# Patient Record
Sex: FEMALE | Race: WHITE | NOT HISPANIC OR LATINO
[De-identification: names, ages, dates, MRNs, and addresses within clinical notes are randomized per-mention and may not be internally consistent; named-entity substitution may affect disease eponyms.]

---

## 2019-05-07 PROBLEM — Z00.00 ENCOUNTER FOR PREVENTIVE HEALTH EXAMINATION: Status: ACTIVE | Noted: 2019-05-07

## 2019-05-09 ENCOUNTER — RECORD ABSTRACTING (OUTPATIENT)
Age: 69
End: 2019-05-09

## 2019-05-09 DIAGNOSIS — Z80.0 FAMILY HISTORY OF MALIGNANT NEOPLASM OF DIGESTIVE ORGANS: ICD-10-CM

## 2019-05-09 DIAGNOSIS — Z83.3 FAMILY HISTORY OF DIABETES MELLITUS: ICD-10-CM

## 2019-05-09 DIAGNOSIS — Z82.5 FAMILY HISTORY OF ASTHMA AND OTHER CHRONIC LOWER RESPIRATORY DISEASES: ICD-10-CM

## 2019-05-09 LAB — CYTOLOGY CVX/VAG DOC THIN PREP: NORMAL

## 2019-05-09 RX ORDER — ERGOCALCIFEROL (VITAMIN D2) 1250 MCG
50000 CAPSULE ORAL
Refills: 0 | Status: ACTIVE | COMMUNITY

## 2019-05-16 ENCOUNTER — RX RENEWAL (OUTPATIENT)
Age: 69
End: 2019-05-16

## 2019-05-23 ENCOUNTER — RX RENEWAL (OUTPATIENT)
Age: 69
End: 2019-05-23

## 2019-06-05 ENCOUNTER — APPOINTMENT (OUTPATIENT)
Dept: INTERNAL MEDICINE | Facility: CLINIC | Age: 69
End: 2019-06-05
Payer: COMMERCIAL

## 2019-06-05 PROCEDURE — 36415 COLL VENOUS BLD VENIPUNCTURE: CPT

## 2019-06-06 LAB
ALBUMIN SERPL ELPH-MCNC: 4.3 G/DL
ALP BLD-CCNC: 68 U/L
ALT SERPL-CCNC: 12 U/L
ANION GAP SERPL CALC-SCNC: 14 MMOL/L
AST SERPL-CCNC: 11 U/L
BILIRUB SERPL-MCNC: 0.5 MG/DL
BUN SERPL-MCNC: 12 MG/DL
CALCIUM SERPL-MCNC: 9.8 MG/DL
CHLORIDE SERPL-SCNC: 102 MMOL/L
CHOLEST SERPL-MCNC: 165 MG/DL
CHOLEST/HDLC SERPL: 3.1 RATIO
CK SERPL-CCNC: 45 U/L
CO2 SERPL-SCNC: 26 MMOL/L
CREAT SERPL-MCNC: 0.7 MG/DL
ESTIMATED AVERAGE GLUCOSE: 97 MG/DL
GLUCOSE SERPL-MCNC: 103 MG/DL
HBA1C MFR BLD HPLC: 5 %
HDLC SERPL-MCNC: 54 MG/DL
LDLC SERPL CALC-MCNC: 96 MG/DL
POTASSIUM SERPL-SCNC: 4.3 MMOL/L
PROT SERPL-MCNC: 6.4 G/DL
SODIUM SERPL-SCNC: 142 MMOL/L
TRIGL SERPL-MCNC: 74 MG/DL

## 2019-06-13 ENCOUNTER — RESULT REVIEW (OUTPATIENT)
Age: 69
End: 2019-06-13

## 2019-06-14 ENCOUNTER — APPOINTMENT (OUTPATIENT)
Dept: GASTROENTEROLOGY | Facility: HOSPITAL | Age: 69
End: 2019-06-14

## 2019-09-25 ENCOUNTER — APPOINTMENT (OUTPATIENT)
Dept: OBGYN | Facility: CLINIC | Age: 69
End: 2019-09-25
Payer: COMMERCIAL

## 2019-09-25 ENCOUNTER — LABORATORY RESULT (OUTPATIENT)
Age: 69
End: 2019-09-25

## 2019-09-25 VITALS
DIASTOLIC BLOOD PRESSURE: 74 MMHG | BODY MASS INDEX: 26.22 KG/M2 | WEIGHT: 148 LBS | SYSTOLIC BLOOD PRESSURE: 120 MMHG | HEIGHT: 63 IN

## 2019-09-25 DIAGNOSIS — Z12.31 ENCOUNTER FOR SCREENING MAMMOGRAM FOR MALIGNANT NEOPLASM OF BREAST: ICD-10-CM

## 2019-09-25 DIAGNOSIS — Z78.0 ASYMPTOMATIC MENOPAUSAL STATE: ICD-10-CM

## 2019-09-25 DIAGNOSIS — R92.2 INCONCLUSIVE MAMMOGRAM: ICD-10-CM

## 2019-09-25 PROCEDURE — 99397 PER PM REEVAL EST PAT 65+ YR: CPT

## 2019-09-26 PROBLEM — R92.2 DENSE BREASTS: Status: ACTIVE | Noted: 2019-09-25

## 2019-09-26 PROBLEM — Z12.31 OTHER SCREENING MAMMOGRAM: Status: ACTIVE | Noted: 2019-09-25

## 2019-09-26 NOTE — HISTORY OF PRESENT ILLNESS
[Good] : being in good health [Healthy Diet] : a healthy diet [Regular Exercise] : regular exercise [Post-Menopause, No Sxs] : post-menopausal, currently without symptoms

## 2019-10-03 LAB
CYTOLOGY CVX/VAG DOC THIN PREP: ABNORMAL
HPV HIGH+LOW RISK DNA PNL CVX: DETECTED

## 2019-11-12 ENCOUNTER — RX RENEWAL (OUTPATIENT)
Age: 69
End: 2019-11-12

## 2019-11-18 ENCOUNTER — RESULT REVIEW (OUTPATIENT)
Age: 69
End: 2019-11-18

## 2019-11-20 ENCOUNTER — RESULT REVIEW (OUTPATIENT)
Age: 69
End: 2019-11-20

## 2019-11-20 ENCOUNTER — APPOINTMENT (OUTPATIENT)
Dept: PAIN MANAGEMENT | Facility: CLINIC | Age: 69
End: 2019-11-20
Payer: COMMERCIAL

## 2019-11-20 VITALS
SYSTOLIC BLOOD PRESSURE: 146 MMHG | DIASTOLIC BLOOD PRESSURE: 80 MMHG | HEIGHT: 63 IN | BODY MASS INDEX: 26.22 KG/M2 | WEIGHT: 148 LBS

## 2019-11-20 DIAGNOSIS — M47.817 SPONDYLOSIS W/OUT MYELOPATHY OR RADICULOPATHY, LUMBOSACRAL REGION: ICD-10-CM

## 2019-11-20 DIAGNOSIS — M79.10 MYALGIA, UNSPECIFIED SITE: ICD-10-CM

## 2019-11-20 PROCEDURE — 99204 OFFICE O/P NEW MOD 45 MIN: CPT

## 2019-11-20 RX ORDER — FLUCONAZOLE 150 MG/1
150 TABLET ORAL DAILY
Refills: 0 | Status: DISCONTINUED | COMMUNITY
End: 2019-11-20

## 2019-11-21 PROBLEM — M79.10 MYALGIA: Status: ACTIVE | Noted: 2019-06-05

## 2019-11-21 NOTE — HISTORY OF PRESENT ILLNESS
[___ wks] : [unfilled] week(s) ago [Back Pain] : back pain [8] : a maximum pain level of 8/10 [1] : a minimum pain level of 1/10 [Dull] : dull [Aching] : aching [Sitting] : sitting [Laying] : laying [FreeTextEntry1] : 69 yof presents w/ low back pain. Pain began acutely. Pain is 8/10 in intensity. The pain is worse with flexion. At night there is no pain. She has a dull and aching sensation in her back. No other recent changes in health. Her quality of life is significantly impaired. Sitting up straight at work makes her pain worse.

## 2019-11-21 NOTE — PHYSICAL EXAM
[de-identified] : Constitutional: Well-developed, in no acute distress\par Eyes: Pupil- Right: normal, Left: normal\par Neck exam: Inspection normal\par Respiratory: Normal effort, speaking in full sentences\par Cardiovascular: Regular rate and rhythm\par Vascular: Dorsal pedis pulses normal and equal bilaterally\par Abdomen: Inspection normal, no distension\par Skin: Inspection normal, no bruising noted\par Musculoskeletal:\par Lumbar Spine:   Gait: Antalgic\par 		Inspection: Normal curvature, no abnormal kyphosis or scoliosis\par 		Facet loading: pain bilaterally\par 		Palpation:\par 			Lumbar and paraspinal muscles: pain bilaterally\par 			Sacroiliac joint: no pain\par 			Greater trochanter: no pain\par 		Muscle Strength:\par 		Iliopsoas: 5/5 bilaterally\par 		Quadriceps: 5/5 bilaterally\par 		Hamstrings: 5/5 bilaterally\par 		Tibialis anterior: 5/5 bilaterally\par 		Extensor hallucis longus: 5/5 bilaterally\par \par 		Sensation: normal and equal in bilateral lower extremities\par \par 		Reflexes:\par 			Patellar reflex: 2+ bilaterally\par 			Ankle jerk reflex: 2+ bilaterally\par 		\par 		Straight leg raise: negative bilaterally\par \par Extremity: no edema noted\par Neurological: Memory normal, AAO x 3, Cranial nerves II - XII grossly normal\par Psychiatric: Appropriate mood and affect, oriented to time, place, person, and situation\par \par \par

## 2019-11-21 NOTE — REVIEW OF SYSTEMS
[Sight Problems] : vision problems [Constipation] : constipation [Back Pain] : back pain [Negative] : Heme/Lymph [FreeTextEntry2] : wt loss

## 2019-11-21 NOTE — ASSESSMENT
[FreeTextEntry1] : 69 yof presents w/ low back pain. Pain began acutely.The pain is worse with flexion. At night there is no pain. Given the significant level of pain in her low back, recommend MRI of the lumbar spine. Start Medrol Dosepak for acute pain. Patient will return to review imaging and plan for potential intervention

## 2020-01-13 ENCOUNTER — APPOINTMENT (OUTPATIENT)
Dept: INTERNAL MEDICINE | Facility: CLINIC | Age: 70
End: 2020-01-13
Payer: COMMERCIAL

## 2020-01-13 ENCOUNTER — RESULT REVIEW (OUTPATIENT)
Age: 70
End: 2020-01-13

## 2020-01-13 VITALS
SYSTOLIC BLOOD PRESSURE: 130 MMHG | DIASTOLIC BLOOD PRESSURE: 70 MMHG | HEART RATE: 76 BPM | OXYGEN SATURATION: 98 % | WEIGHT: 150 LBS | RESPIRATION RATE: 16 BRPM | TEMPERATURE: 99.3 F | HEIGHT: 63 IN | BODY MASS INDEX: 26.58 KG/M2

## 2020-01-13 DIAGNOSIS — R05 COUGH: ICD-10-CM

## 2020-01-13 DIAGNOSIS — Z87.09 PERSONAL HISTORY OF OTHER DISEASES OF THE RESPIRATORY SYSTEM: ICD-10-CM

## 2020-01-13 DIAGNOSIS — R09.82 POSTNASAL DRIP: ICD-10-CM

## 2020-01-13 PROCEDURE — 99213 OFFICE O/P EST LOW 20 MIN: CPT

## 2020-01-13 RX ORDER — DIAZEPAM 10 MG/1
10 TABLET ORAL
Qty: 30 | Refills: 0 | Status: DISCONTINUED | COMMUNITY
Start: 2019-07-16 | End: 2020-01-13

## 2020-01-13 RX ORDER — AZITHROMYCIN 500 MG/1
500 TABLET, FILM COATED ORAL DAILY
Qty: 1 | Refills: 0 | Status: DISCONTINUED | COMMUNITY
Start: 2020-01-06 | End: 2020-01-13

## 2020-01-13 RX ORDER — METHYLPREDNISOLONE 4 MG/1
4 TABLET ORAL
Qty: 1 | Refills: 0 | Status: DISCONTINUED | COMMUNITY
Start: 2019-11-20 | End: 2020-01-13

## 2020-01-13 NOTE — REVIEW OF SYSTEMS
[Fever] : no fever [Fatigue] : fatigue [Chills] : no chills [Shortness Of Breath] : no shortness of breath [Wheezing] : no wheezing [Cough] : cough [Negative] : Neurological

## 2020-01-13 NOTE — HISTORY OF PRESENT ILLNESS
[FreeTextEntry8] : c/o cough and phlegm x 1 week , not fully recovered after 3 days 500 mg Zithro\par well during the day , coughing at night  but not every noght\par no sore throat or sinus congestion

## 2020-01-14 LAB
ALBUMIN SERPL ELPH-MCNC: 4.6 G/DL
ALP BLD-CCNC: 81 U/L
ALT SERPL-CCNC: 13 U/L
ANION GAP SERPL CALC-SCNC: 17 MMOL/L
AST SERPL-CCNC: 17 U/L
BASOPHILS # BLD AUTO: 0.05 K/UL
BASOPHILS NFR BLD AUTO: 0.9 %
BILIRUB SERPL-MCNC: 0.4 MG/DL
BUN SERPL-MCNC: 12 MG/DL
CALCIUM SERPL-MCNC: 9.9 MG/DL
CHLORIDE SERPL-SCNC: 104 MMOL/L
CO2 SERPL-SCNC: 23 MMOL/L
CREAT SERPL-MCNC: 0.73 MG/DL
EOSINOPHIL # BLD AUTO: 0.09 K/UL
EOSINOPHIL NFR BLD AUTO: 1.5 %
GLUCOSE SERPL-MCNC: 100 MG/DL
HCT VFR BLD CALC: 41.8 %
HGB BLD-MCNC: 13.9 G/DL
IMM GRANULOCYTES NFR BLD AUTO: 0.3 %
LYMPHOCYTES # BLD AUTO: 1.81 K/UL
LYMPHOCYTES NFR BLD AUTO: 30.8 %
MAN DIFF?: NORMAL
MCHC RBC-ENTMCNC: 30.1 PG
MCHC RBC-ENTMCNC: 33.3 GM/DL
MCV RBC AUTO: 90.5 FL
MONOCYTES # BLD AUTO: 0.5 K/UL
MONOCYTES NFR BLD AUTO: 8.5 %
NEUTROPHILS # BLD AUTO: 3.4 K/UL
NEUTROPHILS NFR BLD AUTO: 58 %
PLATELET # BLD AUTO: 251 K/UL
POTASSIUM SERPL-SCNC: 4.7 MMOL/L
PROT SERPL-MCNC: 6.9 G/DL
RBC # BLD: 4.62 M/UL
RBC # FLD: 13.2 %
SODIUM SERPL-SCNC: 144 MMOL/L
WBC # FLD AUTO: 5.87 K/UL

## 2020-04-26 ENCOUNTER — MESSAGE (OUTPATIENT)
Age: 70
End: 2020-04-26

## 2020-05-05 LAB
SARS-COV-2 IGG SERPL IA-ACNC: <0.1 INDEX
SARS-COV-2 IGG SERPL QL IA: NEGATIVE

## 2020-07-02 DIAGNOSIS — K21.9 GASTRO-ESOPHAGEAL REFLUX DISEASE W/OUT ESOPHAGITIS: ICD-10-CM

## 2020-08-28 ENCOUNTER — RX RENEWAL (OUTPATIENT)
Age: 70
End: 2020-08-28

## 2020-10-06 ENCOUNTER — APPOINTMENT (OUTPATIENT)
Dept: INTERNAL MEDICINE | Facility: CLINIC | Age: 70
End: 2020-10-06
Payer: MEDICARE

## 2020-10-06 PROCEDURE — 90662 IIV NO PRSV INCREASED AG IM: CPT

## 2020-10-06 PROCEDURE — G0008: CPT

## 2021-02-05 ENCOUNTER — LABORATORY RESULT (OUTPATIENT)
Age: 71
End: 2021-02-05

## 2021-02-05 ENCOUNTER — APPOINTMENT (OUTPATIENT)
Dept: OBGYN | Facility: CLINIC | Age: 71
End: 2021-02-05
Payer: MEDICARE

## 2021-02-05 VITALS
SYSTOLIC BLOOD PRESSURE: 122 MMHG | BODY MASS INDEX: 26.31 KG/M2 | TEMPERATURE: 97.6 F | DIASTOLIC BLOOD PRESSURE: 70 MMHG | HEIGHT: 63 IN | WEIGHT: 148.5 LBS

## 2021-02-05 DIAGNOSIS — Z13.820 ENCOUNTER FOR SCREENING FOR OSTEOPOROSIS: ICD-10-CM

## 2021-02-05 DIAGNOSIS — Z11.51 ENCOUNTER FOR SCREENING FOR HUMAN PAPILLOMAVIRUS (HPV): ICD-10-CM

## 2021-02-05 PROCEDURE — 99072 ADDL SUPL MATRL&STAF TM PHE: CPT

## 2021-02-05 PROCEDURE — G0101: CPT

## 2021-02-15 PROBLEM — Z11.51 SCREENING FOR HPV (HUMAN PAPILLOMAVIRUS): Status: ACTIVE | Noted: 2019-09-26

## 2021-02-15 NOTE — HISTORY OF PRESENT ILLNESS
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

## 2021-02-25 ENCOUNTER — APPOINTMENT (OUTPATIENT)
Dept: INTERNAL MEDICINE | Facility: CLINIC | Age: 71
End: 2021-02-25
Payer: MEDICARE

## 2021-02-25 VITALS
DIASTOLIC BLOOD PRESSURE: 78 MMHG | TEMPERATURE: 99.1 F | OXYGEN SATURATION: 97 % | SYSTOLIC BLOOD PRESSURE: 122 MMHG | HEART RATE: 88 BPM | BODY MASS INDEX: 26.22 KG/M2 | WEIGHT: 148 LBS | HEIGHT: 63 IN

## 2021-02-25 PROCEDURE — 99212 OFFICE O/P EST SF 10 MIN: CPT

## 2021-02-25 PROCEDURE — 99072 ADDL SUPL MATRL&STAF TM PHE: CPT

## 2021-02-25 NOTE — PHYSICAL EXAM
[Normal] : no respiratory distress, lungs were clear to auscultation bilaterally and no accessory muscle use [de-identified] : ++R  TMJ  pain

## 2021-02-25 NOTE — REVIEW OF SYSTEMS
[Earache] : earache [Hearing Loss] : no hearing loss [Nasal Discharge] : no nasal discharge [Sore Throat] : no sore throat [Negative] : Respiratory [FreeTextEntry4] : R see HPI

## 2021-04-19 ENCOUNTER — APPOINTMENT (OUTPATIENT)
Dept: INTERNAL MEDICINE | Facility: CLINIC | Age: 71
End: 2021-04-19
Payer: MEDICARE

## 2021-04-19 VITALS
WEIGHT: 151 LBS | OXYGEN SATURATION: 97 % | SYSTOLIC BLOOD PRESSURE: 122 MMHG | HEART RATE: 84 BPM | BODY MASS INDEX: 26.75 KG/M2 | DIASTOLIC BLOOD PRESSURE: 87 MMHG | TEMPERATURE: 97 F | HEIGHT: 63 IN

## 2021-04-19 PROCEDURE — 99072 ADDL SUPL MATRL&STAF TM PHE: CPT

## 2021-04-19 PROCEDURE — 99214 OFFICE O/P EST MOD 30 MIN: CPT | Mod: 25

## 2021-04-19 PROCEDURE — 93000 ELECTROCARDIOGRAM COMPLETE: CPT

## 2021-04-19 NOTE — HISTORY OF PRESENT ILLNESS
[No Pertinent Cardiac History] : no history of aortic stenosis, atrial fibrillation, coronary artery disease, recent myocardial infarction, or implantable device/pacemaker [No Pertinent Pulmonary History] : no history of asthma, COPD, sleep apnea, or smoking [No Adverse Anesthesia Reaction] : no adverse anesthesia reaction in self or family member [Chronic Anticoagulation] : no chronic anticoagulation [Chronic Kidney Disease] : no chronic kidney disease [Diabetes] : no diabetes [(Patient denies any chest pain, claudication, dyspnea on exertion, orthopnea, palpitations or syncope)] : Patient denies any chest pain, claudication, dyspnea on exertion, orthopnea, palpitations or syncope [Good (7-10 METs)] : Good (7-10 METs) [FreeTextEntry1] : bilat cataract surgery [FreeTextEntry2] : 4/27 and 5/19 [FreeTextEntry3] : dr Mercer [FreeTextEntry4] : Yolanda comes today for preoperative evaluation prior to cataract surgery on the above days. Over the years  her vision  was declining  and at thos point the surgery was recommended.\par  She feel fine otherwise.

## 2021-04-19 NOTE — REVIEW OF SYSTEMS
[Discharge] : no discharge [Pain] : no pain [Vision Problems] : vision problems [Negative] : Heme/Lymph [FreeTextEntry3] : see HPI

## 2021-04-19 NOTE — PHYSICAL EXAM
[Normal] : normal gait, coordination grossly intact, no focal deficits [de-identified] : bilat cataract

## 2021-08-17 LAB
CYTOLOGY CVX/VAG DOC THIN PREP: ABNORMAL
HPV HIGH+LOW RISK DNA PNL CVX: DETECTED

## 2021-09-29 ENCOUNTER — RX RENEWAL (OUTPATIENT)
Age: 71
End: 2021-09-29

## 2021-10-19 ENCOUNTER — APPOINTMENT (OUTPATIENT)
Dept: OBGYN | Facility: CLINIC | Age: 71
End: 2021-10-19
Payer: MEDICARE

## 2021-10-19 ENCOUNTER — APPOINTMENT (OUTPATIENT)
Dept: INTERNAL MEDICINE | Facility: CLINIC | Age: 71
End: 2021-10-19
Payer: MEDICARE

## 2021-10-19 VITALS
WEIGHT: 150 LBS | BODY MASS INDEX: 26.58 KG/M2 | DIASTOLIC BLOOD PRESSURE: 70 MMHG | HEIGHT: 63 IN | SYSTOLIC BLOOD PRESSURE: 120 MMHG

## 2021-10-19 VITALS — TEMPERATURE: 98.3 F

## 2021-10-19 DIAGNOSIS — Z23 ENCOUNTER FOR IMMUNIZATION: ICD-10-CM

## 2021-10-19 PROCEDURE — 57454 BX/CURETT OF CERVIX W/SCOPE: CPT

## 2021-10-19 PROCEDURE — G0008: CPT

## 2021-10-19 PROCEDURE — 90662 IIV NO PRSV INCREASED AG IM: CPT

## 2021-10-19 NOTE — PROCEDURE
[Colposcopy] : Colposcopy  [Consent Obtained] : Consent obtained [Risks] : risks [Benefits] : benefits [Patient] : patient [Bleeding] : bleeding [Allergic Reaction] : allergic reaction [ASCUS] : ASCUS [HPV High Risk] : HPV high risk [No Premedication] : no premedication [Colposcopy Adequate] : colposcopy adequate [Pap Performed] : pap not performed [SCI Fully Visualized] : SCI fully visualized [ECC Performed] : ECC performed [No Abnormalities] : no abnormalities [Biopsy] : biopsy taken [Hemostasis Obtained] : Hemostasis obtained [Tolerated Well] : the patient tolerated the procedure well [de-identified] : 18/45+ [de-identified] : 2 [de-identified] : 4 o clock, 10 o clock [de-identified] : random bx, no lesion visible [de-identified] : no GLADYS

## 2021-12-02 ENCOUNTER — APPOINTMENT (OUTPATIENT)
Dept: INTERNAL MEDICINE | Facility: CLINIC | Age: 71
End: 2021-12-02
Payer: MEDICARE

## 2021-12-02 VITALS — SYSTOLIC BLOOD PRESSURE: 130 MMHG | DIASTOLIC BLOOD PRESSURE: 60 MMHG

## 2021-12-02 VITALS
HEIGHT: 63 IN | SYSTOLIC BLOOD PRESSURE: 142 MMHG | TEMPERATURE: 97.9 F | HEART RATE: 96 BPM | DIASTOLIC BLOOD PRESSURE: 72 MMHG

## 2021-12-02 PROCEDURE — 99213 OFFICE O/P EST LOW 20 MIN: CPT

## 2021-12-02 NOTE — PHYSICAL EXAM
[Normal] : normal rate, regular rhythm, normal S1 and S2 and no murmur heard [No CVA Tenderness] : no CVA  tenderness [No Spinal Tenderness] : no spinal tenderness [Grossly Normal Strength/Tone] : grossly normal strength/tone [No Focal Deficits] : no focal deficits [de-identified] : no SI joint tenderness,  [de-identified] : no sciatic irritation sx

## 2021-12-02 NOTE — REVIEW OF SYSTEMS
[Joint Swelling] : no joint swelling [Muscle Weakness] : no muscle weakness [Back Pain] : back pain [Negative] : Respiratory [FreeTextEntry9] : see HPI

## 2021-12-02 NOTE — HISTORY OF PRESENT ILLNESS
[FreeTextEntry8] : c/o  lower back pain , nonradiated , started yesterday ,  strong at times, better c movements  worse with changing positions/bending \par normal bowel and bladder control

## 2021-12-02 NOTE — HEALTH RISK ASSESSMENT
[] : No [No] : No [0] : 2) Feeling down, depressed, or hopeless: Not at all (0) [PHQ-2 Negative - No further assessment needed] : PHQ-2 Negative - No further assessment needed [[id number]] : 0

## 2021-12-09 RX ORDER — CELECOXIB 200 MG/1
200 CAPSULE ORAL
Qty: 14 | Refills: 0 | Status: DISCONTINUED | COMMUNITY
Start: 2021-12-02 | End: 2021-12-09

## 2022-01-13 ENCOUNTER — APPOINTMENT (OUTPATIENT)
Dept: OBGYN | Facility: CLINIC | Age: 72
End: 2022-01-13
Payer: MEDICARE

## 2022-01-13 PROCEDURE — 99441: CPT

## 2022-01-28 ENCOUNTER — NON-APPOINTMENT (OUTPATIENT)
Age: 72
End: 2022-01-28

## 2022-02-01 ENCOUNTER — APPOINTMENT (OUTPATIENT)
Dept: INTERNAL MEDICINE | Facility: CLINIC | Age: 72
End: 2022-02-01
Payer: MEDICARE

## 2022-02-01 ENCOUNTER — NON-APPOINTMENT (OUTPATIENT)
Age: 72
End: 2022-02-01

## 2022-02-01 VITALS
TEMPERATURE: 97.8 F | HEIGHT: 63 IN | WEIGHT: 155 LBS | SYSTOLIC BLOOD PRESSURE: 128 MMHG | HEART RATE: 99 BPM | DIASTOLIC BLOOD PRESSURE: 74 MMHG | OXYGEN SATURATION: 98 % | BODY MASS INDEX: 27.46 KG/M2

## 2022-02-01 DIAGNOSIS — R73.03 PREDIABETES.: ICD-10-CM

## 2022-02-01 DIAGNOSIS — R87.610 ATYPICAL SQUAMOUS CELLS OF UNDETERMINED SIGNIFICANCE ON CYTOLOGIC SMEAR OF CERVIX (ASC-US): ICD-10-CM

## 2022-02-01 DIAGNOSIS — Z01.419 ENCOUNTER FOR GYNECOLOGICAL EXAMINATION (GENERAL) (ROUTINE) W/OUT ABNORMAL FINDINGS: ICD-10-CM

## 2022-02-01 DIAGNOSIS — R87.810 CERVICAL HIGH RISK HUMAN PAPILLOMAVIRUS (HPV) DNA TEST POSITIVE: ICD-10-CM

## 2022-02-01 PROCEDURE — 93000 ELECTROCARDIOGRAM COMPLETE: CPT

## 2022-02-01 PROCEDURE — 99214 OFFICE O/P EST MOD 30 MIN: CPT | Mod: 25

## 2022-02-01 PROCEDURE — 36415 COLL VENOUS BLD VENIPUNCTURE: CPT

## 2022-02-01 RX ORDER — CYCLOBENZAPRINE HYDROCHLORIDE 10 MG/1
10 TABLET, FILM COATED ORAL
Qty: 15 | Refills: 0 | Status: DISCONTINUED | COMMUNITY
Start: 2021-12-09 | End: 2022-02-01

## 2022-02-01 RX ORDER — DICLOFENAC SODIUM 50 MG/1
50 TABLET, DELAYED RELEASE ORAL
Qty: 28 | Refills: 5 | Status: DISCONTINUED | COMMUNITY
Start: 2021-12-09 | End: 2022-02-01

## 2022-02-02 LAB — CORE LAB BIOPSY: NORMAL

## 2022-02-03 ENCOUNTER — APPOINTMENT (OUTPATIENT)
Dept: INTERNAL MEDICINE | Facility: CLINIC | Age: 72
End: 2022-02-03
Payer: MEDICARE

## 2022-02-03 DIAGNOSIS — Z01.818 ENCOUNTER FOR OTHER PREPROCEDURAL EXAMINATION: ICD-10-CM

## 2022-02-03 LAB
ALBUMIN SERPL ELPH-MCNC: 4.7 G/DL
ALP BLD-CCNC: 96 U/L
ALT SERPL-CCNC: 17 U/L
ANION GAP SERPL CALC-SCNC: 14 MMOL/L
APTT BLD: 28 SEC
AST SERPL-CCNC: 15 U/L
BASOPHILS # BLD AUTO: 0.07 K/UL
BASOPHILS NFR BLD AUTO: 1.2 %
BILIRUB SERPL-MCNC: 0.4 MG/DL
BUN SERPL-MCNC: 17 MG/DL
CALCIUM SERPL-MCNC: 10.1 MG/DL
CHLORIDE SERPL-SCNC: 103 MMOL/L
CO2 SERPL-SCNC: 24 MMOL/L
CREAT SERPL-MCNC: 0.82 MG/DL
EOSINOPHIL # BLD AUTO: 0.1 K/UL
EOSINOPHIL NFR BLD AUTO: 1.7 %
GLUCOSE SERPL-MCNC: 127 MG/DL
HCT VFR BLD CALC: 45.6 %
HGB BLD-MCNC: 14.9 G/DL
IMM GRANULOCYTES NFR BLD AUTO: 0.2 %
INR PPP: 0.95 RATIO
LYMPHOCYTES # BLD AUTO: 1.8 K/UL
LYMPHOCYTES NFR BLD AUTO: 29.8 %
MAN DIFF?: NORMAL
MCHC RBC-ENTMCNC: 29.9 PG
MCHC RBC-ENTMCNC: 32.7 GM/DL
MCV RBC AUTO: 91.4 FL
MONOCYTES # BLD AUTO: 0.49 K/UL
MONOCYTES NFR BLD AUTO: 8.1 %
NEUTROPHILS # BLD AUTO: 3.57 K/UL
NEUTROPHILS NFR BLD AUTO: 59 %
PLATELET # BLD AUTO: 247 K/UL
POTASSIUM SERPL-SCNC: 4.7 MMOL/L
PROT SERPL-MCNC: 6.5 G/DL
PT BLD: 11.2 SEC
RBC # BLD: 4.99 M/UL
RBC # FLD: 12.6 %
SODIUM SERPL-SCNC: 141 MMOL/L
TSH SERPL-ACNC: 0.77 UIU/ML
WBC # FLD AUTO: 6.04 K/UL

## 2022-02-03 PROCEDURE — ZZZZZ: CPT

## 2022-02-03 NOTE — HISTORY OF PRESENT ILLNESS
[No Pertinent Cardiac History] : no history of aortic stenosis, atrial fibrillation, coronary artery disease, recent myocardial infarction, or implantable device/pacemaker [No Pertinent Pulmonary History] : no history of asthma, COPD, sleep apnea, or smoking [No Adverse Anesthesia Reaction] : no adverse anesthesia reaction in self or family member [(Patient denies any chest pain, claudication, dyspnea on exertion, orthopnea, palpitations or syncope)] : Patient denies any chest pain, claudication, dyspnea on exertion, orthopnea, palpitations or syncope [Good (7-10 METs)] : Good (7-10 METs) [Chronic Anticoagulation] : no chronic anticoagulation [Chronic Kidney Disease] : no chronic kidney disease [Diabetes] : no diabetes [FreeTextEntry1] : SANDI  [FreeTextEntry2] : 2/8 [FreeTextEntry3] : dr Yoon [FreeTextEntry4] : Yolanda comes in for preoperative evaluation prior to LEEP procedure on February 8 with Dr. Yoon. She feels fine.\par She has not had a Pap for many years and in 2019 she was HPV positive. She was asked her to return in 2 years and in 2021 she was again HPV positive and at this time a colposcopy was taken showing high grade intraepithelial lesion. A LEEP procedure was recommended.

## 2022-02-03 NOTE — ASSESSMENT
[Patient Optimized for Surgery] : Patient optimized for surgery [No Further Testing Recommended] : no further testing recommended [As per surgery] : as per surgery [FreeTextEntry4] : Ms. SOSA  is a  71 year-old female  with no significant history of coronary artery disease.  She  denies chest pain palpitations or shortness of breath and  Her EKG today is normal.  She  doesn't take blood thinners and will continue  her  medications perioperatively as instructed.\par \par Ms. SOSA  has a low  risk for perioperative cardiovascular complications and will undergo the procedure as planned provided today's blood work is favorable.\par

## 2022-02-04 LAB — ABO + RH PNL BLD: NORMAL

## 2022-02-05 ENCOUNTER — RESULT REVIEW (OUTPATIENT)
Age: 72
End: 2022-02-05

## 2022-02-07 ENCOUNTER — RESULT REVIEW (OUTPATIENT)
Age: 72
End: 2022-02-07

## 2022-02-08 ENCOUNTER — APPOINTMENT (OUTPATIENT)
Dept: OBGYN | Facility: HOSPITAL | Age: 72
End: 2022-02-08
Payer: MEDICARE

## 2022-02-08 PROCEDURE — 57522 CONIZATION OF CERVIX: CPT

## 2022-02-24 ENCOUNTER — APPOINTMENT (OUTPATIENT)
Dept: OBGYN | Facility: CLINIC | Age: 72
End: 2022-02-24

## 2022-03-29 ENCOUNTER — APPOINTMENT (OUTPATIENT)
Dept: OBGYN | Facility: CLINIC | Age: 72
End: 2022-03-29

## 2022-11-18 ENCOUNTER — APPOINTMENT (OUTPATIENT)
Dept: INTERNAL MEDICINE | Facility: CLINIC | Age: 72
End: 2022-11-18

## 2022-11-18 VITALS
HEIGHT: 63 IN | DIASTOLIC BLOOD PRESSURE: 70 MMHG | HEART RATE: 121 BPM | OXYGEN SATURATION: 98 % | TEMPERATURE: 101.2 F | SYSTOLIC BLOOD PRESSURE: 138 MMHG

## 2022-11-18 DIAGNOSIS — J02.9 ACUTE PHARYNGITIS, UNSPECIFIED: ICD-10-CM

## 2022-11-18 DIAGNOSIS — R50.9 FEVER, UNSPECIFIED: ICD-10-CM

## 2022-11-18 PROCEDURE — 99214 OFFICE O/P EST MOD 30 MIN: CPT

## 2022-11-18 NOTE — PHYSICAL EXAM
[Ill-Appearing] : ill-appearing [Normal TMs] : both tympanic membranes were normal [No Lymphadenopathy] : no lymphadenopathy [Supple] : supple [Normal] : normal gait, coordination grossly intact, no focal deficits and deep tendon reflexes were 2+ and symmetric [de-identified] : throat erythema , no  tonsils,,. no exudates, no postnasal drip

## 2022-11-18 NOTE — HEALTH RISK ASSESSMENT
[Never] : Never [No] : No [0] : 2) Feeling down, depressed, or hopeless: Not at all (0) [PHQ-2 Negative - No further assessment needed] : PHQ-2 Negative - No further assessment needed [[id number]] : 0

## 2022-11-18 NOTE — HISTORY OF PRESENT ILLNESS
[FreeTextEntry8] : Yolanda comes in for medical evaluation.  She started with sore throat 2 days ago and she has been afebrile today.  She has no shortness of breath, minimal cough, no body aches.  The main concern is unable to swallow secondary to pain mainly on the left side.\par She had 2 COVID tests at home both negative the last 1 this morning.

## 2022-11-18 NOTE — REVIEW OF SYSTEMS
[Fever] : no fever [Fatigue] : fatigue [Night Sweats] : no night sweats [Earache] : no earache [Hoarseness] : hoarseness [Nasal Discharge] : nasal discharge [Sore Throat] : sore throat [Postnasal Drip] : postnasal drip [Shortness Of Breath] : no shortness of breath [Wheezing] : no wheezing [Cough] : no cough [Dyspnea on Exertion] : no dyspnea on exertion [Negative] : Neurological

## 2022-11-19 LAB
HPIV1 RNA SPEC QL NAA+PROBE: DETECTED
RAPID RVP RESULT: DETECTED
SARS-COV-2 RNA PNL RESP NAA+PROBE: NOT DETECTED

## 2022-12-05 ENCOUNTER — NON-APPOINTMENT (OUTPATIENT)
Age: 72
End: 2022-12-05

## 2022-12-05 ENCOUNTER — APPOINTMENT (OUTPATIENT)
Dept: GASTROENTEROLOGY | Facility: CLINIC | Age: 72
End: 2022-12-05

## 2022-12-05 VITALS
DIASTOLIC BLOOD PRESSURE: 78 MMHG | SYSTOLIC BLOOD PRESSURE: 154 MMHG | WEIGHT: 155 LBS | TEMPERATURE: 98.5 F | RESPIRATION RATE: 16 BRPM | OXYGEN SATURATION: 96 % | BODY MASS INDEX: 27.46 KG/M2 | HEIGHT: 63 IN | HEART RATE: 100 BPM

## 2022-12-05 DIAGNOSIS — Z86.010 PERSONAL HISTORY OF COLONIC POLYPS: ICD-10-CM

## 2022-12-05 DIAGNOSIS — Z12.11 ENCOUNTER FOR SCREENING FOR MALIGNANT NEOPLASM OF COLON: ICD-10-CM

## 2022-12-05 DIAGNOSIS — Z80.0 ENCOUNTER FOR SCREENING FOR MALIGNANT NEOPLASM OF COLON: ICD-10-CM

## 2022-12-05 PROCEDURE — 99204 OFFICE O/P NEW MOD 45 MIN: CPT

## 2022-12-05 RX ORDER — AZITHROMYCIN 250 MG/1
250 TABLET, FILM COATED ORAL
Qty: 6 | Refills: 0 | Status: DISCONTINUED | COMMUNITY
Start: 2022-11-18 | End: 2022-12-05

## 2022-12-05 NOTE — HISTORY OF PRESENT ILLNESS
[FreeTextEntry1] : KIN SOSA  is being evaluated at the request of Dr. Guevara  for an opinion re: family h/o colon cancer  ( son) and  h/o colon polyps.  Denies  nausea, vomiting, fever, chills, diarrhea, constiparion, melena, hematemesis, BRBPR.  Admits to occasional heartburn without alarms  sxs 2-4  times  yearly. Managed with OTC  PPI\par \par \par - Colonoscopy 6/2019: hemorrhoids / polyp / diverticulosis ( colonoscopy recommended in 3  years)

## 2022-12-05 NOTE — ASSESSMENT
[FreeTextEntry1] : 1. Family h/o colon cancer / history of  colon polyps: Colonoscopy scheduled\par \par 2. GERD:  infrequent  with no alarm sxs. Dietary and  lifestyle  management.  EGD if  sxs become  for frequent  or  develops alarm sxs\par \par Pertinent available records reviewed\par Risks of the procedure including but not limited to bleeding / perforation / infection / anesthesia complication / missed polyp or lesion explained to the  patient . The patient expressed understanding and a desire to proceed with the procedure.\par \par Risk of not doing procedure includes but is not limited to missed or delayed diagnosis of gastrointestinal pathology.\par

## 2022-12-05 NOTE — CONSULT LETTER
[Dear  ___] : Dear  [unfilled], [Consult Letter:] : I had the pleasure of evaluating your patient, [unfilled]. [Please see my note below.] : Please see my note below. [Consult Closing:] : Thank you very much for allowing me to participate in the care of this patient.  If you have any questions, please do not hesitate to contact me. [Sincerely,] : Sincerely, [FreeTextEntry3] : Gabo Adams MD\par tel: [phone]\par fax: [phone]\par

## 2022-12-05 NOTE — PHYSICAL EXAM
[Alert] : alert [Sclera] : the sclera and conjunctiva were normal [No Respiratory Distress] : no respiratory distress [None] : no edema [Abnormal Walk] : normal gait [Normal Color / Pigmentation] : normal skin color and pigmentation [No Focal Deficits] : no focal deficits [Oriented To Time, Place, And Person] : oriented to person, place, and time

## 2023-01-23 ENCOUNTER — RESULT REVIEW (OUTPATIENT)
Age: 73
End: 2023-01-23

## 2023-01-25 ENCOUNTER — RESULT REVIEW (OUTPATIENT)
Age: 73
End: 2023-01-25

## 2023-01-26 ENCOUNTER — APPOINTMENT (OUTPATIENT)
Dept: GASTROENTEROLOGY | Facility: HOSPITAL | Age: 73
End: 2023-01-26

## 2023-06-02 ENCOUNTER — LABORATORY RESULT (OUTPATIENT)
Age: 73
End: 2023-06-02

## 2023-06-02 ENCOUNTER — APPOINTMENT (OUTPATIENT)
Dept: INTERNAL MEDICINE | Facility: CLINIC | Age: 73
End: 2023-06-02
Payer: MEDICARE

## 2023-06-02 VITALS
OXYGEN SATURATION: 98 % | HEART RATE: 99 BPM | TEMPERATURE: 99 F | SYSTOLIC BLOOD PRESSURE: 122 MMHG | HEIGHT: 63 IN | DIASTOLIC BLOOD PRESSURE: 68 MMHG

## 2023-06-02 DIAGNOSIS — M54.9 DORSALGIA, UNSPECIFIED: ICD-10-CM

## 2023-06-02 DIAGNOSIS — R10.819 ABDOMINAL TENDERNESS, UNSPECIFIED SITE: ICD-10-CM

## 2023-06-02 DIAGNOSIS — H26.9 UNSPECIFIED CATARACT: ICD-10-CM

## 2023-06-02 DIAGNOSIS — M26.641 ARTHRITIS OF RIGHT TEMPOROMANDIBULAR JOINT: ICD-10-CM

## 2023-06-02 DIAGNOSIS — N39.0 URINARY TRACT INFECTION, SITE NOT SPECIFIED: ICD-10-CM

## 2023-06-02 PROCEDURE — 99214 OFFICE O/P EST MOD 30 MIN: CPT

## 2023-06-02 NOTE — HEALTH RISK ASSESSMENT
[No] : No [No falls in past year] : Patient reported no falls in the past year [Former] : Former [> 15 Years] : > 15 Years

## 2023-06-02 NOTE — REVIEW OF SYSTEMS
[Dysuria] : no dysuria [Hematuria] : no hematuria [Frequency] : no frequency [Muscle Pain] : muscle pain [Back Pain] : no back pain [Negative] : Respiratory [FreeTextEntry9] : see HPI, lower abd

## 2023-06-02 NOTE — HISTORY OF PRESENT ILLNESS
[FreeTextEntry8] : Comes in complaining of lower abdominal pain started Wednesday after she was golfing on Tue and continued on Wed and Thursday.  The pain is associated with twisting her torso and occurs mainly when getting out of bed or turning in bed.  She denies urinary frequency, fever chills or foul-smelling urine.  she  is concerned that may be a UTI.

## 2023-06-02 NOTE — PHYSICAL EXAM
[No Edema] : there was no peripheral edema [Normal] : normal gait, coordination grossly intact, no focal deficits and deep tendon reflexes were 2+ and symmetric [de-identified] : tender lower abd mm, L of midline  suprapubic, no hernia

## 2023-06-05 LAB
APPEARANCE: CLEAR
BACTERIA UR CULT: NORMAL
BILIRUBIN URINE: NEGATIVE
BLOOD URINE: ABNORMAL
COLOR: YELLOW
GLUCOSE QUALITATIVE U: NEGATIVE MG/DL
KETONES URINE: NEGATIVE MG/DL
LEUKOCYTE ESTERASE URINE: ABNORMAL
NITRITE URINE: NEGATIVE
PH URINE: 5.5
PROTEIN URINE: NEGATIVE MG/DL
SPECIFIC GRAVITY URINE: 1.02
UROBILINOGEN URINE: 0.2 MG/DL

## 2024-01-16 ENCOUNTER — APPOINTMENT (OUTPATIENT)
Dept: INTERNAL MEDICINE | Facility: CLINIC | Age: 74
End: 2024-01-16
Payer: MEDICARE

## 2024-01-16 VITALS
DIASTOLIC BLOOD PRESSURE: 70 MMHG | OXYGEN SATURATION: 98 % | BODY MASS INDEX: 26.75 KG/M2 | HEIGHT: 63 IN | WEIGHT: 151 LBS | SYSTOLIC BLOOD PRESSURE: 130 MMHG | TEMPERATURE: 97.8 F | HEART RATE: 95 BPM

## 2024-01-16 DIAGNOSIS — F41.9 ANXIETY DISORDER, UNSPECIFIED: ICD-10-CM

## 2024-01-16 DIAGNOSIS — K21.9 GASTRO-ESOPHAGEAL REFLUX DISEASE W/OUT ESOPHAGITIS: ICD-10-CM

## 2024-01-16 PROCEDURE — 99214 OFFICE O/P EST MOD 30 MIN: CPT

## 2024-01-16 PROCEDURE — 93000 ELECTROCARDIOGRAM COMPLETE: CPT

## 2024-01-16 PROCEDURE — G2211 COMPLEX E/M VISIT ADD ON: CPT

## 2024-01-16 RX ORDER — DIAZEPAM 10 MG/1
10 TABLET ORAL DAILY
Qty: 30 | Refills: 0 | Status: ACTIVE | COMMUNITY
Start: 2021-02-25 | End: 1900-01-01

## 2024-01-16 NOTE — HISTORY OF PRESENT ILLNESS
[FreeTextEntry8] : Yolanda comes in complaining of 6 months of off-and-on anterior chest pressure and mainly to the right.  Lasts 10 to 15 minutes and then goes away.  She tried changing her diet and did not find any trigger in the diet to cause this pain.  She has normal bowel movements and does not have nausea, heartburn or vomiting.  She does not have this pain when she exercises.

## 2024-01-16 NOTE — REVIEW OF SYSTEMS
[Chest Pain] : chest pain [Palpitations] : no palpitations [Lower Ext Edema] : no lower extremity edema [Orthopnea] : no orthopnea [Paroxysmal Nocturnal Dyspnea] : no paroxysmal nocturnal dyspnea [Abdominal Pain] : no abdominal pain [Nausea] : no nausea [Vomiting] : no vomiting [Negative] : Neurological

## 2024-01-16 NOTE — HEALTH RISK ASSESSMENT
[No] : No [No falls in past year] : Patient reported no falls in the past year [0] : 2) Feeling down, depressed, or hopeless: Not at all (0) [PHQ-2 Negative - No further assessment needed] : PHQ-2 Negative - No further assessment needed [[id number]] : 0

## 2024-01-17 LAB
ALBUMIN SERPL ELPH-MCNC: 4.8 G/DL
ALP BLD-CCNC: 94 U/L
ALT SERPL-CCNC: 12 U/L
ANION GAP SERPL CALC-SCNC: 10 MMOL/L
AST SERPL-CCNC: 14 U/L
BILIRUB SERPL-MCNC: 0.5 MG/DL
BUN SERPL-MCNC: 27 MG/DL
CALCIUM SERPL-MCNC: 10 MG/DL
CHLORIDE SERPL-SCNC: 103 MMOL/L
CHOLEST SERPL-MCNC: 158 MG/DL
CO2 SERPL-SCNC: 29 MMOL/L
CREAT SERPL-MCNC: 0.85 MG/DL
EGFR: 71 ML/MIN/1.73M2
ESTIMATED AVERAGE GLUCOSE: 103 MG/DL
GLUCOSE SERPL-MCNC: 93 MG/DL
HBA1C MFR BLD HPLC: 5.2 %
HDLC SERPL-MCNC: 50 MG/DL
LDLC SERPL CALC-MCNC: 89 MG/DL
NONHDLC SERPL-MCNC: 108 MG/DL
POTASSIUM SERPL-SCNC: 5 MMOL/L
PROT SERPL-MCNC: 6.7 G/DL
SODIUM SERPL-SCNC: 142 MMOL/L
TRIGL SERPL-MCNC: 103 MG/DL

## 2024-01-20 ENCOUNTER — RESULT REVIEW (OUTPATIENT)
Age: 74
End: 2024-01-20

## 2024-01-22 ENCOUNTER — APPOINTMENT (OUTPATIENT)
Dept: CARDIOLOGY | Facility: CLINIC | Age: 74
End: 2024-01-22
Payer: MEDICARE

## 2024-01-22 ENCOUNTER — NON-APPOINTMENT (OUTPATIENT)
Age: 74
End: 2024-01-22

## 2024-01-22 VITALS
HEIGHT: 63 IN | OXYGEN SATURATION: 100 % | WEIGHT: 152 LBS | BODY MASS INDEX: 26.93 KG/M2 | SYSTOLIC BLOOD PRESSURE: 160 MMHG | DIASTOLIC BLOOD PRESSURE: 81 MMHG | RESPIRATION RATE: 14 BRPM | HEART RATE: 86 BPM

## 2024-01-22 DIAGNOSIS — I10 ESSENTIAL (PRIMARY) HYPERTENSION: ICD-10-CM

## 2024-01-22 DIAGNOSIS — I25.10 ATHEROSCLEROTIC HEART DISEASE OF NATIVE CORONARY ARTERY W/OUT ANGINA PECTORIS: ICD-10-CM

## 2024-01-22 DIAGNOSIS — E78.2 MIXED HYPERLIPIDEMIA: ICD-10-CM

## 2024-01-22 DIAGNOSIS — Z87.891 PERSONAL HISTORY OF NICOTINE DEPENDENCE: ICD-10-CM

## 2024-01-22 PROCEDURE — 99204 OFFICE O/P NEW MOD 45 MIN: CPT

## 2024-01-22 RX ORDER — LISINOPRIL 10 MG/1
10 TABLET ORAL
Qty: 90 | Refills: 3 | Status: DISCONTINUED | COMMUNITY
End: 2024-01-22

## 2024-01-22 NOTE — ASSESSMENT
[FreeTextEntry1] : 72 yo female with hypertension, hyperlipidemia, CAD (coronary artery calcification noted on 1/22/24 calcium score -> only RCA 45), and recurrent chest/epigastric pain over the past several months in addition to exertional SOB. ECG from 1/16/24 was reviewed today and demonstrated sinus rhythm, rate 83 bpm, poor R wave progression.  Given CAD (per 1/20/24 coronary calcium score) and reported CP and exertional SOB, will perform treadmill nuclear stress test for ischemic evaluation/risk stratification. Will perform echocardiogram to assess LV function and structural heart disease. After review of test results, will determine if further cardiac work-up or intervention is clinically indicated.  BP is currently uncontrolled. Will increase lisinopril to 20 mg po daily.  LDL 89 per 1/16/24 labs. Will continue atorvastatin 10 mg po daily for hyperlipidemia management.

## 2024-01-22 NOTE — HISTORY OF PRESENT ILLNESS
[FreeTextEntry1] : 74 yo female with hypertension, hyperlipidemia, and CAD (coronary artery calcification noted on 1/22/24 calcium score -> only RCA 45), who presents today for cardiac evaluation of recurrent chest/epigastric pain over the past several months, which occurs randomly several times per week and lasts up to 15 minutes before resolving. He reports that this discomfort is different in nature from her usual GERD symptoms. She also reports exertional SOB particularly with stairs. Patient denies palpitations, syncope, edema, melena, hematochezia, or hematemesis. She states that she stopped drinking alcohol, dairy, and tea without improvement in her symptoms.

## 2024-01-22 NOTE — PHYSICAL EXAM
[Well Nourished] : well nourished [Well Developed] : well developed [Normal Venous Pressure] : normal venous pressure [No Acute Distress] : no acute distress [Normal Conjunctiva] : normal conjunctiva [Normal S1, S2] : normal S1, S2 [No Carotid Bruit] : no carotid bruit [No Rub] : no rub [No Gallop] : no gallop [No Murmur] : no murmur [Good Air Entry] : good air entry [Clear Lung Fields] : clear lung fields [No Respiratory Distress] : no respiratory distress  [No Edema] : no edema [No Clubbing] : no clubbing [No Cyanosis] : no cyanosis [Moves all extremities] : moves all extremities [No Focal Deficits] : no focal deficits [Normal Speech] : normal speech [Alert and Oriented] : alert and oriented [Normal memory] : normal memory

## 2024-01-22 NOTE — CARDIOLOGY SUMMARY
[de-identified] : 1/22/ [de-identified] : 1/22/24 ECG: Sinus rhythm, rate 83 bpm, poor R wave progression

## 2024-01-22 NOTE — REVIEW OF SYSTEMS
[Dyspnea on exertion] : dyspnea during exertion [Chest Discomfort] : chest discomfort [Abdominal Pain] : abdominal pain [Heartburn] : heartburn [Negative] : Heme/Lymph [Vomiting] : no vomiting [Nausea] : no nausea [Dysphagia] : no dysphagia [Diarrhea] : diarrhea [Blood in Stool] : no blood in stool

## 2024-02-14 ENCOUNTER — APPOINTMENT (OUTPATIENT)
Dept: CARDIOLOGY | Facility: CLINIC | Age: 74
End: 2024-02-14
Payer: MEDICARE

## 2024-02-14 DIAGNOSIS — R06.02 SHORTNESS OF BREATH: ICD-10-CM

## 2024-02-14 DIAGNOSIS — R07.9 CHEST PAIN, UNSPECIFIED: ICD-10-CM

## 2024-02-14 PROCEDURE — 93306 TTE W/DOPPLER COMPLETE: CPT

## 2024-02-15 ENCOUNTER — NON-APPOINTMENT (OUTPATIENT)
Age: 74
End: 2024-02-15

## 2024-03-01 ENCOUNTER — RESULT REVIEW (OUTPATIENT)
Age: 74
End: 2024-03-01

## 2024-03-19 RX ORDER — PANTOPRAZOLE 40 MG/1
40 TABLET, DELAYED RELEASE ORAL
Qty: 90 | Refills: 0 | Status: ACTIVE | COMMUNITY
Start: 2024-03-19 | End: 1900-01-01

## 2024-03-19 RX ORDER — OMEPRAZOLE 20 MG/1
20 CAPSULE, DELAYED RELEASE ORAL DAILY
Qty: 30 | Refills: 0 | Status: DISCONTINUED | COMMUNITY
Start: 2020-07-02 | End: 2024-03-19

## 2024-03-27 RX ORDER — LISINOPRIL 20 MG/1
20 TABLET ORAL DAILY
Qty: 90 | Refills: 3 | Status: ACTIVE | COMMUNITY
Start: 2024-01-22 | End: 1900-01-01

## 2024-03-27 RX ORDER — ATORVASTATIN CALCIUM 10 MG/1
10 TABLET, FILM COATED ORAL
Qty: 90 | Refills: 3 | Status: ACTIVE | COMMUNITY
Start: 2020-08-28 | End: 1900-01-01

## 2025-04-15 ENCOUNTER — NON-APPOINTMENT (OUTPATIENT)
Age: 75
End: 2025-04-15

## 2025-04-17 ENCOUNTER — APPOINTMENT (OUTPATIENT)
Dept: OBGYN | Facility: CLINIC | Age: 75
End: 2025-04-17
Payer: MEDICARE

## 2025-04-17 VITALS
BODY MASS INDEX: 27.46 KG/M2 | SYSTOLIC BLOOD PRESSURE: 130 MMHG | DIASTOLIC BLOOD PRESSURE: 70 MMHG | HEIGHT: 63 IN | WEIGHT: 155 LBS

## 2025-04-17 DIAGNOSIS — N81.6 RECTOCELE: ICD-10-CM

## 2025-04-17 DIAGNOSIS — N81.6 CYSTOCELE, UNSPECIFIED: ICD-10-CM

## 2025-04-17 DIAGNOSIS — N81.10 CYSTOCELE, UNSPECIFIED: ICD-10-CM

## 2025-04-17 DIAGNOSIS — K59.09 OTHER CONSTIPATION: ICD-10-CM

## 2025-04-17 PROCEDURE — 99459 PELVIC EXAMINATION: CPT

## 2025-04-17 PROCEDURE — 99203 OFFICE O/P NEW LOW 30 MIN: CPT

## 2025-04-20 PROBLEM — N81.6 RECTOCELE, FEMALE: Status: ACTIVE | Noted: 2025-04-20

## 2025-04-20 PROBLEM — N81.10 CYSTOCELE WITH RECTOCELE: Status: ACTIVE | Noted: 2025-04-20

## 2025-04-20 PROBLEM — K59.09 CHRONIC CONSTIPATION: Status: ACTIVE | Noted: 2025-04-20

## 2025-07-30 ENCOUNTER — APPOINTMENT (OUTPATIENT)
Dept: GASTROENTEROLOGY | Facility: CLINIC | Age: 75
End: 2025-07-30
Payer: MEDICARE

## 2025-07-30 VITALS
DIASTOLIC BLOOD PRESSURE: 70 MMHG | SYSTOLIC BLOOD PRESSURE: 110 MMHG | BODY MASS INDEX: 24.67 KG/M2 | WEIGHT: 141 LBS | HEIGHT: 63.5 IN

## 2025-07-30 DIAGNOSIS — Z80.0 ENCOUNTER FOR SCREENING FOR MALIGNANT NEOPLASM OF COLON: ICD-10-CM

## 2025-07-30 DIAGNOSIS — Z12.11 ENCOUNTER FOR SCREENING FOR MALIGNANT NEOPLASM OF COLON: ICD-10-CM

## 2025-07-30 DIAGNOSIS — K21.9 GASTRO-ESOPHAGEAL REFLUX DISEASE W/OUT ESOPHAGITIS: ICD-10-CM

## 2025-07-30 PROCEDURE — G2211 COMPLEX E/M VISIT ADD ON: CPT

## 2025-07-30 PROCEDURE — 99215 OFFICE O/P EST HI 40 MIN: CPT
